# Patient Record
Sex: FEMALE | Race: WHITE | NOT HISPANIC OR LATINO | RURAL
[De-identification: names, ages, dates, MRNs, and addresses within clinical notes are randomized per-mention and may not be internally consistent; named-entity substitution may affect disease eponyms.]

---

## 2017-10-30 ENCOUNTER — APPOINTMENT (RX ONLY)
Dept: RURAL CLINIC 2 | Facility: CLINIC | Age: 47
Setting detail: DERMATOLOGY
End: 2017-10-30

## 2017-10-30 DIAGNOSIS — D22 MELANOCYTIC NEVI: ICD-10-CM

## 2017-10-30 DIAGNOSIS — L81.4 OTHER MELANIN HYPERPIGMENTATION: ICD-10-CM

## 2017-10-30 PROBLEM — D22.61 MELANOCYTIC NEVI OF RIGHT UPPER LIMB, INCLUDING SHOULDER: Status: ACTIVE | Noted: 2017-10-30

## 2017-10-30 PROBLEM — D22.5 MELANOCYTIC NEVI OF TRUNK: Status: ACTIVE | Noted: 2017-10-30

## 2017-10-30 PROBLEM — L20.84 INTRINSIC (ALLERGIC) ECZEMA: Status: ACTIVE | Noted: 2017-10-30

## 2017-10-30 PROCEDURE — 99213 OFFICE O/P EST LOW 20 MIN: CPT

## 2017-10-30 PROCEDURE — ? COUNSELING

## 2017-10-30 PROCEDURE — ? OBSERVATION AND MEASURE

## 2017-10-30 ASSESSMENT — LOCATION DETAILED DESCRIPTION DERM
LOCATION DETAILED: RIGHT POSTERIOR SHOULDER
LOCATION DETAILED: RIGHT MID-UPPER BACK
LOCATION DETAILED: LEFT INFERIOR LATERAL LOWER BACK
LOCATION DETAILED: RIGHT INFERIOR UPPER BACK
LOCATION DETAILED: LEFT MEDIAL BREAST 10-11:00 REGION
LOCATION DETAILED: LEFT INFERIOR UPPER BACK
LOCATION DETAILED: LEFT MID-UPPER BACK
LOCATION DETAILED: RIGHT SUPERIOR LATERAL LOWER BACK

## 2017-10-30 ASSESSMENT — LOCATION ZONE DERM
LOCATION ZONE: ARM
LOCATION ZONE: TRUNK

## 2017-10-30 ASSESSMENT — LOCATION SIMPLE DESCRIPTION DERM
LOCATION SIMPLE: LEFT UPPER BACK
LOCATION SIMPLE: LEFT BREAST
LOCATION SIMPLE: LEFT LOWER BACK
LOCATION SIMPLE: RIGHT UPPER BACK
LOCATION SIMPLE: RIGHT LOWER BACK
LOCATION SIMPLE: RIGHT SHOULDER

## 2017-10-30 NOTE — PROCEDURE: OBSERVATION
Body Location Override (Optional - Billing Will Still Be Based On Selected Body Map Location If Applicable): right upper back
Size Of Lesion: .3 x .2 cm
Size Of Lesion: .2 x .2 cm
Body Location Override (Optional - Billing Will Still Be Based On Selected Body Map Location If Applicable): right mid back
Size Of Lesion: .4 x .2 cm
Body Location Override (Optional - Billing Will Still Be Based On Selected Body Map Location If Applicable): left mid back
Detail Level: Detailed
Body Location Override (Optional - Billing Will Still Be Based On Selected Body Map Location If Applicable): right lower back
Size Of Lesion: .3 x .3 cm
Size Of Lesion: .2 x .2 cm
Body Location Override (Optional - Billing Will Still Be Based On Selected Body Map Location If Applicable): left medial breast
Size Of Lesion: .4 x .2 cm
Body Location Override (Optional - Billing Will Still Be Based On Selected Body Map Location If Applicable): right posterior shoulder
Size Of Lesion: .4 x .2 cm
Body Location Override (Optional - Billing Will Still Be Based On Selected Body Map Location If Applicable): left upper back

## 2019-02-19 ENCOUNTER — APPOINTMENT (RX ONLY)
Dept: RURAL CLINIC 1 | Facility: CLINIC | Age: 49
Setting detail: DERMATOLOGY
End: 2019-02-19

## 2019-02-19 DIAGNOSIS — L82.1 OTHER SEBORRHEIC KERATOSIS: ICD-10-CM

## 2019-02-19 DIAGNOSIS — D22 MELANOCYTIC NEVI: ICD-10-CM

## 2019-02-19 PROBLEM — D22.61 MELANOCYTIC NEVI OF RIGHT UPPER LIMB, INCLUDING SHOULDER: Status: ACTIVE | Noted: 2019-02-19

## 2019-02-19 PROBLEM — D22.5 MELANOCYTIC NEVI OF TRUNK: Status: ACTIVE | Noted: 2019-02-19

## 2019-02-19 PROCEDURE — ? COUNSELING

## 2019-02-19 PROCEDURE — ? OBSERVATION AND MEASURE

## 2019-02-19 PROCEDURE — 99213 OFFICE O/P EST LOW 20 MIN: CPT

## 2019-02-19 ASSESSMENT — LOCATION SIMPLE DESCRIPTION DERM
LOCATION SIMPLE: LEFT PRETIBIAL REGION
LOCATION SIMPLE: RIGHT UPPER BACK
LOCATION SIMPLE: RIGHT FOREARM
LOCATION SIMPLE: RIGHT LOWER BACK
LOCATION SIMPLE: LEFT BREAST
LOCATION SIMPLE: LEFT UPPER BACK
LOCATION SIMPLE: RIGHT SHOULDER

## 2019-02-19 ASSESSMENT — LOCATION DETAILED DESCRIPTION DERM
LOCATION DETAILED: LEFT INFERIOR UPPER BACK
LOCATION DETAILED: RIGHT INFERIOR UPPER BACK
LOCATION DETAILED: LEFT SUPERIOR UPPER BACK
LOCATION DETAILED: RIGHT POSTERIOR SHOULDER
LOCATION DETAILED: RIGHT DISTAL RADIAL DORSAL FOREARM
LOCATION DETAILED: LEFT PROXIMAL PRETIBIAL REGION
LOCATION DETAILED: RIGHT SUPERIOR LATERAL LOWER BACK
LOCATION DETAILED: LEFT MEDIAL BREAST 10-11:00 REGION
LOCATION DETAILED: RIGHT SUPERIOR UPPER BACK

## 2019-02-19 ASSESSMENT — LOCATION ZONE DERM
LOCATION ZONE: TRUNK
LOCATION ZONE: ARM
LOCATION ZONE: LEG

## 2019-02-19 NOTE — PROCEDURE: OBSERVATION
Body Location Override (Optional - Billing Will Still Be Based On Selected Body Map Location If Applicable): right posterior shoulder
Detail Level: Detailed
Size Of Lesion: .3 x .3 cm
Size Of Lesion: .4 x .2 cm
Body Location Override (Optional - Billing Will Still Be Based On Selected Body Map Location If Applicable): left mid back
Body Location Override (Optional - Billing Will Still Be Based On Selected Body Map Location If Applicable): left upper back
Size Of Lesion: .2 x .2 cm
Size Of Lesion: .3 x .2 cm
Body Location Override (Optional - Billing Will Still Be Based On Selected Body Map Location If Applicable): right upper back
Size Of Lesion: .4 x .2 cm
Body Location Override (Optional - Billing Will Still Be Based On Selected Body Map Location If Applicable): right lower back
Size Of Lesion: .2 x .2 cm
Body Location Override (Optional - Billing Will Still Be Based On Selected Body Map Location If Applicable): left medial breast
Body Location Override (Optional - Billing Will Still Be Based On Selected Body Map Location If Applicable): right mid back
Size Of Lesion: .4 x .2 cm

## 2020-02-18 ENCOUNTER — APPOINTMENT (RX ONLY)
Dept: RURAL CLINIC 1 | Facility: CLINIC | Age: 50
Setting detail: DERMATOLOGY
End: 2020-02-18

## 2020-02-18 DIAGNOSIS — D22 MELANOCYTIC NEVI: ICD-10-CM

## 2020-02-18 DIAGNOSIS — L98.8 OTHER SPECIFIED DISORDERS OF THE SKIN AND SUBCUTANEOUS TISSUE: ICD-10-CM

## 2020-02-18 PROBLEM — D22.5 MELANOCYTIC NEVI OF TRUNK: Status: ACTIVE | Noted: 2020-02-18

## 2020-02-18 PROBLEM — D22.61 MELANOCYTIC NEVI OF RIGHT UPPER LIMB, INCLUDING SHOULDER: Status: ACTIVE | Noted: 2020-02-18

## 2020-02-18 PROBLEM — D22.72 MELANOCYTIC NEVI OF LEFT LOWER LIMB, INCLUDING HIP: Status: ACTIVE | Noted: 2020-02-18

## 2020-02-18 PROCEDURE — ? TREATMENT REGIMEN

## 2020-02-18 PROCEDURE — ? OBSERVATION AND MEASURE

## 2020-02-18 PROCEDURE — 99214 OFFICE O/P EST MOD 30 MIN: CPT

## 2020-02-18 ASSESSMENT — LOCATION DETAILED DESCRIPTION DERM
LOCATION DETAILED: RIGHT NARIS
LOCATION DETAILED: LEFT ANTERIOR PROXIMAL THIGH
LOCATION DETAILED: RIGHT POSTERIOR SHOULDER
LOCATION DETAILED: RIGHT SUPERIOR LATERAL LOWER BACK
LOCATION DETAILED: LEFT INFERIOR UPPER BACK
LOCATION DETAILED: LEFT SUPERIOR UPPER BACK
LOCATION DETAILED: RIGHT INFERIOR UPPER BACK
LOCATION DETAILED: RIGHT SUPERIOR UPPER BACK
LOCATION DETAILED: LEFT MEDIAL BREAST 10-11:00 REGION

## 2020-02-18 ASSESSMENT — LOCATION SIMPLE DESCRIPTION DERM
LOCATION SIMPLE: RIGHT SHOULDER
LOCATION SIMPLE: RIGHT LOWER BACK
LOCATION SIMPLE: LEFT THIGH
LOCATION SIMPLE: RIGHT UPPER BACK
LOCATION SIMPLE: LEFT UPPER BACK
LOCATION SIMPLE: LEFT BREAST
LOCATION SIMPLE: RIGHT NOSE

## 2020-02-18 ASSESSMENT — LOCATION ZONE DERM
LOCATION ZONE: NOSE
LOCATION ZONE: LEG
LOCATION ZONE: ARM
LOCATION ZONE: TRUNK

## 2020-02-18 NOTE — PROCEDURE: OBSERVATION
Body Location Override (Optional - Billing Will Still Be Based On Selected Body Map Location If Applicable): right posterior shoulder
Size Of Lesion: .3 x .3 cm
Detail Level: Detailed
Size Of Lesion: .4 x .2 cm
Body Location Override (Optional - Billing Will Still Be Based On Selected Body Map Location If Applicable): left mid back
Size Of Lesion: .2 x .2 cm
Body Location Override (Optional - Billing Will Still Be Based On Selected Body Map Location If Applicable): left upper back
Size Of Lesion: .3 x .2 cm
Body Location Override (Optional - Billing Will Still Be Based On Selected Body Map Location If Applicable): right upper back
Body Location Override (Optional - Billing Will Still Be Based On Selected Body Map Location If Applicable): right lower back
Size Of Lesion: .4 x .2 cm
Body Location Override (Optional - Billing Will Still Be Based On Selected Body Map Location If Applicable): left medial breast
Size Of Lesion: .2 x .2 cm
Size Of Lesion: .4 x .2 cm
Body Location Override (Optional - Billing Will Still Be Based On Selected Body Map Location If Applicable): right mid back
Body Location Override (Optional - Billing Will Still Be Based On Selected Body Map Location If Applicable): left upper lateral thigh
Size Of Lesion: .3 x .2 cm

## 2021-03-19 ENCOUNTER — APPOINTMENT (RX ONLY)
Dept: URBAN - NONMETROPOLITAN AREA CLINIC 9 | Facility: CLINIC | Age: 51
Setting detail: DERMATOLOGY
End: 2021-03-19

## 2021-03-19 DIAGNOSIS — D22 MELANOCYTIC NEVI: ICD-10-CM

## 2021-03-19 DIAGNOSIS — L98.8 OTHER SPECIFIED DISORDERS OF THE SKIN AND SUBCUTANEOUS TISSUE: ICD-10-CM

## 2021-03-19 PROBLEM — D22.5 MELANOCYTIC NEVI OF TRUNK: Status: ACTIVE | Noted: 2021-03-19

## 2021-03-19 PROBLEM — D22.61 MELANOCYTIC NEVI OF RIGHT UPPER LIMB, INCLUDING SHOULDER: Status: ACTIVE | Noted: 2021-03-19

## 2021-03-19 PROBLEM — D22.72 MELANOCYTIC NEVI OF LEFT LOWER LIMB, INCLUDING HIP: Status: ACTIVE | Noted: 2021-03-19

## 2021-03-19 PROCEDURE — 99213 OFFICE O/P EST LOW 20 MIN: CPT

## 2021-03-19 PROCEDURE — ? OBSERVATION AND MEASURE

## 2021-03-19 PROCEDURE — ? TREATMENT REGIMEN

## 2021-03-19 ASSESSMENT — LOCATION SIMPLE DESCRIPTION DERM
LOCATION SIMPLE: RIGHT POSTERIOR AXILLA
LOCATION SIMPLE: LEFT UPPER BACK
LOCATION SIMPLE: NOSE
LOCATION SIMPLE: RIGHT UPPER BACK
LOCATION SIMPLE: LEFT THIGH
LOCATION SIMPLE: RIGHT SHOULDER
LOCATION SIMPLE: LEFT BREAST
LOCATION SIMPLE: RIGHT LOWER BACK

## 2021-03-19 ASSESSMENT — LOCATION DETAILED DESCRIPTION DERM
LOCATION DETAILED: RIGHT SUPERIOR LATERAL LOWER BACK
LOCATION DETAILED: LEFT INFERIOR UPPER BACK
LOCATION DETAILED: LEFT SUPERIOR UPPER BACK
LOCATION DETAILED: RIGHT SUPERIOR UPPER BACK
LOCATION DETAILED: LEFT MEDIAL BREAST 10-11:00 REGION
LOCATION DETAILED: RIGHT POSTERIOR AXILLA
LOCATION DETAILED: RIGHT INFERIOR UPPER BACK
LOCATION DETAILED: NASAL INFRATIP
LOCATION DETAILED: LEFT ANTERIOR DISTAL THIGH
LOCATION DETAILED: RIGHT POSTERIOR SHOULDER

## 2021-03-19 ASSESSMENT — LOCATION ZONE DERM
LOCATION ZONE: LEG
LOCATION ZONE: AXILLAE
LOCATION ZONE: NOSE
LOCATION ZONE: TRUNK
LOCATION ZONE: ARM

## 2021-03-19 NOTE — PROCEDURE: OBSERVATION
Body Location Override (Optional - Billing Will Still Be Based On Selected Body Map Location If Applicable): right posterior shoulder
Size Of Lesion: .3 x .3 cm
Detail Level: Detailed
Size Of Lesion: .4 x .2 cm
Body Location Override (Optional - Billing Will Still Be Based On Selected Body Map Location If Applicable): left mid back
Size Of Lesion: .2 x .2 cm
Body Location Override (Optional - Billing Will Still Be Based On Selected Body Map Location If Applicable): left upper back
Size Of Lesion: .3 x .2 cm
Body Location Override (Optional - Billing Will Still Be Based On Selected Body Map Location If Applicable): right upper back
Body Location Override (Optional - Billing Will Still Be Based On Selected Body Map Location If Applicable): right lower back
Size Of Lesion: .4 x .2 cm
Body Location Override (Optional - Billing Will Still Be Based On Selected Body Map Location If Applicable): left medial breast
Size Of Lesion: .2 x .2 cm
Morphology Per Location (Optional): Very faint
Size Of Lesion: .4 x .2 cm
Body Location Override (Optional - Billing Will Still Be Based On Selected Body Map Location If Applicable): right mid back
Body Location Override (Optional - Billing Will Still Be Based On Selected Body Map Location If Applicable): left upper lateral thigh
Size Of Lesion: .3 x .2 cm
Body Location Override (Optional - Billing Will Still Be Based On Selected Body Map Location If Applicable): right axillae
Size Of Lesion: .1 x .1 cm

## 2021-03-19 NOTE — PROCEDURE: TREATMENT REGIMEN
Detail Level: Simple
Plan: Patient states the fissure comes and goes with the dryness and humidity. She uses Aquaphor when fissure is present

## 2022-03-21 ENCOUNTER — APPOINTMENT (RX ONLY)
Dept: URBAN - NONMETROPOLITAN AREA CLINIC 9 | Facility: CLINIC | Age: 52
Setting detail: DERMATOLOGY
End: 2022-03-21

## 2022-03-21 DIAGNOSIS — D22 MELANOCYTIC NEVI: ICD-10-CM

## 2022-03-21 DIAGNOSIS — L70.0 ACNE VULGARIS: ICD-10-CM

## 2022-03-21 DIAGNOSIS — L98.8 OTHER SPECIFIED DISORDERS OF THE SKIN AND SUBCUTANEOUS TISSUE: ICD-10-CM

## 2022-03-21 PROBLEM — D22.72 MELANOCYTIC NEVI OF LEFT LOWER LIMB, INCLUDING HIP: Status: ACTIVE | Noted: 2022-03-21

## 2022-03-21 PROBLEM — D22.39 MELANOCYTIC NEVI OF OTHER PARTS OF FACE: Status: ACTIVE | Noted: 2022-03-21

## 2022-03-21 PROBLEM — D22.61 MELANOCYTIC NEVI OF RIGHT UPPER LIMB, INCLUDING SHOULDER: Status: ACTIVE | Noted: 2022-03-21

## 2022-03-21 PROBLEM — D22.5 MELANOCYTIC NEVI OF TRUNK: Status: ACTIVE | Noted: 2022-03-21

## 2022-03-21 PROBLEM — D22.71 MELANOCYTIC NEVI OF RIGHT LOWER LIMB, INCLUDING HIP: Status: ACTIVE | Noted: 2022-03-21

## 2022-03-21 PROCEDURE — ? OBSERVATION AND MEASURE

## 2022-03-21 PROCEDURE — ? TREATMENT REGIMEN

## 2022-03-21 PROCEDURE — 99213 OFFICE O/P EST LOW 20 MIN: CPT

## 2022-03-21 ASSESSMENT — LOCATION ZONE DERM
LOCATION ZONE: AXILLAE
LOCATION ZONE: FACE
LOCATION ZONE: TRUNK
LOCATION ZONE: LEG
LOCATION ZONE: ARM
LOCATION ZONE: NOSE

## 2022-03-21 ASSESSMENT — LOCATION SIMPLE DESCRIPTION DERM
LOCATION SIMPLE: LEFT THIGH
LOCATION SIMPLE: LEFT BREAST
LOCATION SIMPLE: RIGHT UPPER BACK
LOCATION SIMPLE: RIGHT CHEEK
LOCATION SIMPLE: RIGHT POSTERIOR AXILLA
LOCATION SIMPLE: RIGHT THIGH
LOCATION SIMPLE: LEFT UPPER BACK
LOCATION SIMPLE: LEFT CHEEK
LOCATION SIMPLE: NOSE
LOCATION SIMPLE: RIGHT LOWER BACK
LOCATION SIMPLE: RIGHT SHOULDER

## 2022-03-21 ASSESSMENT — LOCATION DETAILED DESCRIPTION DERM
LOCATION DETAILED: RIGHT POSTERIOR AXILLA
LOCATION DETAILED: RIGHT INFERIOR UPPER BACK
LOCATION DETAILED: NASAL ROOT
LOCATION DETAILED: LEFT INFERIOR UPPER BACK
LOCATION DETAILED: RIGHT SUPERIOR LATERAL LOWER BACK
LOCATION DETAILED: LEFT MEDIAL BREAST 10-11:00 REGION
LOCATION DETAILED: LEFT INFERIOR CENTRAL MALAR CHEEK
LOCATION DETAILED: RIGHT POSTERIOR SHOULDER
LOCATION DETAILED: LEFT SUPERIOR UPPER BACK
LOCATION DETAILED: RIGHT INFERIOR CENTRAL MALAR CHEEK
LOCATION DETAILED: RIGHT SUPERIOR UPPER BACK
LOCATION DETAILED: RIGHT ANTERIOR DISTAL THIGH
LOCATION DETAILED: LEFT ANTERIOR DISTAL THIGH
LOCATION DETAILED: NASAL INFRATIP

## 2022-03-21 NOTE — PROCEDURE: TREATMENT REGIMEN
Detail Level: Simple
Plan: Patient states the fissure comes and goes with the dryness and humidity. She uses Aquaphor when fissure is present
Hide Vanicream Products: No
Detail Level: Zone
Action 2: Continue
Other Instructions: Patient reports of a mild flare up

## 2022-03-21 NOTE — PROCEDURE: MIPS QUALITY
Quality 111:Pneumonia Vaccination Status For Older Adults: Pneumococcal Vaccination not Administered or Previously Received, Reason not Otherwise Specified
Quality 226: Preventive Care And Screening: Tobacco Use: Screening And Cessation Intervention: Patient screened for tobacco use and is an ex/non-smoker
Quality 110: Preventive Care And Screening: Influenza Immunization: Influenza immunization was not ordered or administered, reason not given
Quality 431: Preventive Care And Screening: Unhealthy Alcohol Use - Screening: Patient screened for unhealthy alcohol use using a single question and scores less than 2 times per year
Detail Level: Detailed
Quality 130: Documentation Of Current Medications In The Medical Record: Current Medications Documented
Quality 431: Preventive Care And Screening: Unhealthy Alcohol Use - Screening: Patient not identified as an unhealthy alcohol user when screened for unhealthy alcohol use using a systematic screening method

## 2023-03-20 ENCOUNTER — APPOINTMENT (RX ONLY)
Dept: URBAN - NONMETROPOLITAN AREA CLINIC 9 | Facility: CLINIC | Age: 53
Setting detail: DERMATOLOGY
End: 2023-03-20

## 2023-03-20 DIAGNOSIS — L98.8 OTHER SPECIFIED DISORDERS OF THE SKIN AND SUBCUTANEOUS TISSUE: ICD-10-CM

## 2023-03-20 DIAGNOSIS — L81.4 OTHER MELANIN HYPERPIGMENTATION: ICD-10-CM

## 2023-03-20 DIAGNOSIS — Z71.89 OTHER SPECIFIED COUNSELING: ICD-10-CM

## 2023-03-20 DIAGNOSIS — L82.1 OTHER SEBORRHEIC KERATOSIS: ICD-10-CM

## 2023-03-20 DIAGNOSIS — D22 MELANOCYTIC NEVI: ICD-10-CM

## 2023-03-20 DIAGNOSIS — L85.3 XEROSIS CUTIS: ICD-10-CM

## 2023-03-20 DIAGNOSIS — D18.0 HEMANGIOMA: ICD-10-CM

## 2023-03-20 PROBLEM — D22.5 MELANOCYTIC NEVI OF TRUNK: Status: ACTIVE | Noted: 2023-03-20

## 2023-03-20 PROBLEM — D22.39 MELANOCYTIC NEVI OF OTHER PARTS OF FACE: Status: ACTIVE | Noted: 2023-03-20

## 2023-03-20 PROBLEM — D22.71 MELANOCYTIC NEVI OF RIGHT LOWER LIMB, INCLUDING HIP: Status: ACTIVE | Noted: 2023-03-20

## 2023-03-20 PROBLEM — D22.72 MELANOCYTIC NEVI OF LEFT LOWER LIMB, INCLUDING HIP: Status: ACTIVE | Noted: 2023-03-20

## 2023-03-20 PROBLEM — D18.01 HEMANGIOMA OF SKIN AND SUBCUTANEOUS TISSUE: Status: ACTIVE | Noted: 2023-03-20

## 2023-03-20 PROBLEM — D22.61 MELANOCYTIC NEVI OF RIGHT UPPER LIMB, INCLUDING SHOULDER: Status: ACTIVE | Noted: 2023-03-20

## 2023-03-20 PROCEDURE — ? COUNSELING

## 2023-03-20 PROCEDURE — ? OBSERVATION

## 2023-03-20 PROCEDURE — ? TREATMENT REGIMEN

## 2023-03-20 PROCEDURE — ? DIAGNOSIS COMMENT

## 2023-03-20 PROCEDURE — 99213 OFFICE O/P EST LOW 20 MIN: CPT

## 2023-03-20 PROCEDURE — ? OBSERVATION AND MEASURE

## 2023-03-20 ASSESSMENT — LOCATION SIMPLE DESCRIPTION DERM
LOCATION SIMPLE: LEFT PRETIBIAL REGION
LOCATION SIMPLE: NOSE
LOCATION SIMPLE: RIGHT LOWER BACK
LOCATION SIMPLE: RIGHT AXILLARY VAULT
LOCATION SIMPLE: RIGHT HAND
LOCATION SIMPLE: LEFT HAND
LOCATION SIMPLE: LEFT BREAST
LOCATION SIMPLE: LEFT UPPER BACK
LOCATION SIMPLE: RIGHT UPPER BACK
LOCATION SIMPLE: RIGHT THIGH
LOCATION SIMPLE: POSTERIOR NECK
LOCATION SIMPLE: LEFT THIGH
LOCATION SIMPLE: RIGHT SHOULDER

## 2023-03-20 ASSESSMENT — LOCATION ZONE DERM
LOCATION ZONE: AXILLAE
LOCATION ZONE: ARM
LOCATION ZONE: TRUNK
LOCATION ZONE: NECK
LOCATION ZONE: HAND
LOCATION ZONE: NOSE
LOCATION ZONE: LEG

## 2023-03-20 ASSESSMENT — LOCATION DETAILED DESCRIPTION DERM
LOCATION DETAILED: LEFT INFERIOR UPPER BACK
LOCATION DETAILED: NASAL INFRATIP
LOCATION DETAILED: LEFT ANTERIOR DISTAL THIGH
LOCATION DETAILED: LEFT SUPERIOR UPPER BACK
LOCATION DETAILED: LEFT MEDIAL BREAST 10-11:00 REGION
LOCATION DETAILED: RIGHT SUPERIOR LATERAL LOWER BACK
LOCATION DETAILED: RIGHT SUPERIOR UPPER BACK
LOCATION DETAILED: RIGHT RADIAL DORSAL HAND
LOCATION DETAILED: MID POSTERIOR NECK
LOCATION DETAILED: RIGHT INFERIOR UPPER BACK
LOCATION DETAILED: RIGHT POSTERIOR SHOULDER
LOCATION DETAILED: RIGHT ANTERIOR MEDIAL DISTAL THIGH
LOCATION DETAILED: RIGHT NASAL ROOT
LOCATION DETAILED: RIGHT AXILLARY VAULT
LOCATION DETAILED: LEFT RADIAL DORSAL HAND
LOCATION DETAILED: LEFT PROXIMAL PRETIBIAL REGION

## 2023-03-20 NOTE — PROCEDURE: OBSERVATION
Body Location Override (Optional - Billing Will Still Be Based On Selected Body Map Location If Applicable): right nasal bridge
Detail Level: Detailed
Size Of Lesion: .2 x .2 cm
Instructions (Optional): Decreased in size from .3 x .2 cm
Body Location Override (Optional - Billing Will Still Be Based On Selected Body Map Location If Applicable): right posterior shoulder
Size Of Lesion: .3 x .3 cm
Size Of Lesion: .4 x .2 cm
Body Location Override (Optional - Billing Will Still Be Based On Selected Body Map Location If Applicable): left mid back
Size Of Lesion: .2 x .2 cm
Body Location Override (Optional - Billing Will Still Be Based On Selected Body Map Location If Applicable): left upper back
Size Of Lesion: .3 x .2 cm
Body Location Override (Optional - Billing Will Still Be Based On Selected Body Map Location If Applicable): right upper back
Body Location Override (Optional - Billing Will Still Be Based On Selected Body Map Location If Applicable): right lower back
Size Of Lesion: .4 x .2 cm
Body Location Override (Optional - Billing Will Still Be Based On Selected Body Map Location If Applicable): left medial breast
Size Of Lesion: .2 x .2 cm
Morphology Per Location (Optional): Very faint
Size Of Lesion: .4 x .2 cm
Body Location Override (Optional - Billing Will Still Be Based On Selected Body Map Location If Applicable): right mid back
Body Location Override (Optional - Billing Will Still Be Based On Selected Body Map Location If Applicable): left upper lateral thigh
Size Of Lesion: .3 x .2 cm
Body Location Override (Optional - Billing Will Still Be Based On Selected Body Map Location If Applicable): right axilla
Size Of Lesion: .1 x .1 cm
Body Location Override (Optional - Billing Will Still Be Based On Selected Body Map Location If Applicable): right medial thigh
Size Of Lesion: .3 x .1 cm
Body Location Override (Optional - Billing Will Still Be Based On Selected Body Map Location If Applicable): left shin
Size Of Lesion In Cm (Optional): 0.5
X Size Of Lesion In Cm (Optional): 0.4

## 2023-03-20 NOTE — PROCEDURE: DIAGNOSIS COMMENT
Comment: Pt reports the condition is exacerbated by allergies.
Detail Level: Simple
Render Risk Assessment In Note?: no

## 2023-03-20 NOTE — PROCEDURE: TREATMENT REGIMEN
Detail Level: Simple
Plan: Patient states the fissure comes and goes with the dryness and humidity. She uses Aquaphor when fissure is present
Continue Regimen: Aquaphor- apply to affected area as needed
Plan: Pt declines an Rx today.
Detail Level: Zone
Continue Regimen: Moisturizer- apply daily as needed
Continue Regimen: SPF 50 Shirt when outdoors

## 2023-06-13 NOTE — PROCEDURE: OBSERVATION
Refill request received for lisinopril (ZESTRIL) 40 MG tablet    Last office visit: 6/7/22  Next office visit: Visit date not found  Last refill: 1/31/23  Is patient due for refill: yes     Patient needs appointment.   
Body Location Override (Optional - Billing Will Still Be Based On Selected Body Map Location If Applicable): right posterior shoulder
Size Of Lesion: .3 x .3 cm
Detail Level: Detailed
Size Of Lesion: .4 x .2 cm
Body Location Override (Optional - Billing Will Still Be Based On Selected Body Map Location If Applicable): left mid back
Size Of Lesion: .2 x .2 cm
Body Location Override (Optional - Billing Will Still Be Based On Selected Body Map Location If Applicable): left upper back
Size Of Lesion: .3 x .2 cm
Body Location Override (Optional - Billing Will Still Be Based On Selected Body Map Location If Applicable): right upper back
Body Location Override (Optional - Billing Will Still Be Based On Selected Body Map Location If Applicable): right lower back
Size Of Lesion: .4 x .2 cm
Body Location Override (Optional - Billing Will Still Be Based On Selected Body Map Location If Applicable): left medial breast
Size Of Lesion: .2 x .2 cm
Morphology Per Location (Optional): Very faint
Size Of Lesion: .4 x .2 cm
Body Location Override (Optional - Billing Will Still Be Based On Selected Body Map Location If Applicable): right mid back
Body Location Override (Optional - Billing Will Still Be Based On Selected Body Map Location If Applicable): left upper lateral thigh
Size Of Lesion: .3 x .2 cm
Body Location Override (Optional - Billing Will Still Be Based On Selected Body Map Location If Applicable): right axillae
Size Of Lesion: .1 x .1 cm
Body Location Override (Optional - Billing Will Still Be Based On Selected Body Map Location If Applicable): right nasal bridge
Size Of Lesion: .3 x .2 cm
Body Location Override (Optional - Billing Will Still Be Based On Selected Body Map Location If Applicable): right medial thigh
Size Of Lesion: .3 x .1 cm

## 2024-03-18 ENCOUNTER — APPOINTMENT (RX ONLY)
Dept: URBAN - NONMETROPOLITAN AREA CLINIC 9 | Facility: CLINIC | Age: 54
Setting detail: DERMATOLOGY
End: 2024-03-18

## 2024-03-18 DIAGNOSIS — L81.4 OTHER MELANIN HYPERPIGMENTATION: ICD-10-CM

## 2024-03-18 DIAGNOSIS — L82.1 OTHER SEBORRHEIC KERATOSIS: ICD-10-CM

## 2024-03-18 DIAGNOSIS — Z71.89 OTHER SPECIFIED COUNSELING: ICD-10-CM

## 2024-03-18 DIAGNOSIS — D22 MELANOCYTIC NEVI: ICD-10-CM

## 2024-03-18 DIAGNOSIS — D18.0 HEMANGIOMA: ICD-10-CM

## 2024-03-18 PROBLEM — D18.01 HEMANGIOMA OF SKIN AND SUBCUTANEOUS TISSUE: Status: ACTIVE | Noted: 2024-03-18

## 2024-03-18 PROBLEM — D22.39 MELANOCYTIC NEVI OF OTHER PARTS OF FACE: Status: ACTIVE | Noted: 2024-03-18

## 2024-03-18 PROBLEM — D22.61 MELANOCYTIC NEVI OF RIGHT UPPER LIMB, INCLUDING SHOULDER: Status: ACTIVE | Noted: 2024-03-18

## 2024-03-18 PROBLEM — D22.71 MELANOCYTIC NEVI OF RIGHT LOWER LIMB, INCLUDING HIP: Status: ACTIVE | Noted: 2024-03-18

## 2024-03-18 PROBLEM — D22.5 MELANOCYTIC NEVI OF TRUNK: Status: ACTIVE | Noted: 2024-03-18

## 2024-03-18 PROBLEM — D22.72 MELANOCYTIC NEVI OF LEFT LOWER LIMB, INCLUDING HIP: Status: ACTIVE | Noted: 2024-03-18

## 2024-03-18 PROCEDURE — ? COUNSELING

## 2024-03-18 PROCEDURE — ? OBSERVATION AND MEASURE

## 2024-03-18 PROCEDURE — 99213 OFFICE O/P EST LOW 20 MIN: CPT

## 2024-03-18 PROCEDURE — ? OBSERVATION

## 2024-03-18 ASSESSMENT — LOCATION ZONE DERM
LOCATION ZONE: TRUNK
LOCATION ZONE: AXILLAE
LOCATION ZONE: NOSE
LOCATION ZONE: NECK
LOCATION ZONE: ARM
LOCATION ZONE: LEG

## 2024-03-18 ASSESSMENT — LOCATION DETAILED DESCRIPTION DERM
LOCATION DETAILED: LEFT INFERIOR UPPER BACK
LOCATION DETAILED: RIGHT AXILLARY VAULT
LOCATION DETAILED: RIGHT SUPERIOR LATERAL LOWER BACK
LOCATION DETAILED: LEFT ANTERIOR DISTAL THIGH
LOCATION DETAILED: RIGHT POSTERIOR SHOULDER
LOCATION DETAILED: RIGHT SUPERIOR UPPER BACK
LOCATION DETAILED: RIGHT INFERIOR UPPER BACK
LOCATION DETAILED: MID TRAPEZIAL NECK
LOCATION DETAILED: LEFT PROXIMAL PRETIBIAL REGION
LOCATION DETAILED: RIGHT ANTERIOR MEDIAL DISTAL THIGH
LOCATION DETAILED: MID POSTERIOR NECK
LOCATION DETAILED: LEFT MEDIAL BREAST 10-11:00 REGION
LOCATION DETAILED: LEFT SUPERIOR UPPER BACK
LOCATION DETAILED: RIGHT NASAL ROOT

## 2024-03-18 ASSESSMENT — LOCATION SIMPLE DESCRIPTION DERM
LOCATION SIMPLE: RIGHT THIGH
LOCATION SIMPLE: LEFT BREAST
LOCATION SIMPLE: LEFT THIGH
LOCATION SIMPLE: RIGHT LOWER BACK
LOCATION SIMPLE: LEFT PRETIBIAL REGION
LOCATION SIMPLE: RIGHT SHOULDER
LOCATION SIMPLE: NOSE
LOCATION SIMPLE: POSTERIOR NECK
LOCATION SIMPLE: RIGHT UPPER BACK
LOCATION SIMPLE: TRAPEZIAL NECK
LOCATION SIMPLE: RIGHT AXILLARY VAULT
LOCATION SIMPLE: LEFT UPPER BACK

## 2024-03-18 NOTE — PROCEDURE: OBSERVATION
Body Location Override (Optional - Billing Will Still Be Based On Selected Body Map Location If Applicable): right nasal bridge
Detail Level: Detailed
Size Of Lesion: .3 x .2 cm
Instructions (Optional): Returned to original size
Body Location Override (Optional - Billing Will Still Be Based On Selected Body Map Location If Applicable): right posterior shoulder
Size Of Lesion: .3 x .3 cm
Size Of Lesion: .4 x .2 cm
Body Location Override (Optional - Billing Will Still Be Based On Selected Body Map Location If Applicable): left mid back
Size Of Lesion: .2 x .2 cm
Body Location Override (Optional - Billing Will Still Be Based On Selected Body Map Location If Applicable): left upper back
Body Location Override (Optional - Billing Will Still Be Based On Selected Body Map Location If Applicable): right upper back
Body Location Override (Optional - Billing Will Still Be Based On Selected Body Map Location If Applicable): right lower back
Body Location Override (Optional - Billing Will Still Be Based On Selected Body Map Location If Applicable): left medial breast
Size Of Lesion: .2 x .2 cm- Faint
Body Location Override (Optional - Billing Will Still Be Based On Selected Body Map Location If Applicable): right mid back
Body Location Override (Optional - Billing Will Still Be Based On Selected Body Map Location If Applicable): left upper lateral thigh
Body Location Override (Optional - Billing Will Still Be Based On Selected Body Map Location If Applicable): right axilla
Size Of Lesion: .1 x .1 cm
Body Location Override (Optional - Billing Will Still Be Based On Selected Body Map Location If Applicable): right medial thigh
Size Of Lesion: .3 x .1 cm
Body Location Override (Optional - Billing Will Still Be Based On Selected Body Map Location If Applicable): left shin
Size Of Lesion In Cm (Optional): 0.5
X Size Of Lesion In Cm (Optional): 0.4